# Patient Record
Sex: FEMALE | Race: BLACK OR AFRICAN AMERICAN | Employment: FULL TIME | URBAN - METROPOLITAN AREA
[De-identification: names, ages, dates, MRNs, and addresses within clinical notes are randomized per-mention and may not be internally consistent; named-entity substitution may affect disease eponyms.]

---

## 2017-03-27 ENCOUNTER — OFFICE VISIT (OUTPATIENT)
Dept: SURGERY | Age: 52
End: 2017-03-27

## 2017-03-27 VITALS
WEIGHT: 207 LBS | SYSTOLIC BLOOD PRESSURE: 120 MMHG | HEART RATE: 64 BPM | DIASTOLIC BLOOD PRESSURE: 70 MMHG | BODY MASS INDEX: 36.68 KG/M2 | RESPIRATION RATE: 20 BRPM | TEMPERATURE: 98.6 F | HEIGHT: 63 IN | OXYGEN SATURATION: 98 %

## 2017-03-27 DIAGNOSIS — K91.2 POSTOPERATIVE INTESTINAL MALABSORPTION: Primary | ICD-10-CM

## 2017-03-27 DIAGNOSIS — E55.9 VITAMIN D DEFICIENCY: ICD-10-CM

## 2017-03-27 DIAGNOSIS — E66.01 SEVERE OBESITY (BMI 35.0-39.9): ICD-10-CM

## 2017-03-27 NOTE — MR AVS SNAPSHOT
Visit Information Date & Time Provider Department Dept. Phone Encounter #  
 3/27/2017  8:00 AM Chantel Biswas NP [facility] GENERAL SURGERY [address] [phone] [id number] Upcoming Health Maintenance Date Due DTaP/Tdap/Td series (1 - Tdap) 9/3/1984 INFLUENZA AGE 9 TO ADULT 8/1/2016 PAP AKA CERVICAL CYTOLOGY 5/11/2018 BREAST CANCER SCRN MAMMOGRAM 8/13/2018 COLONOSCOPY 7/14/2021 Allergies as of 3/27/2017  Review Complete On: 3/27/2017 By: Chantel Biswas NP Severity Noted Reaction Type Reactions Bacitracin High 07/14/2011   Systemic Anaphylaxis Throat swelling, hives, abdominal pain Neosporin [Neomycin-bacitracin-polymyxin] Medium 07/14/2011   Systemic Swelling Eye drops caused eyes to swell Adhesive  09/10/2016   Topical Rash, Itching Surgical Glue (Derma Yates) Pineapple  09/27/2016    Hives [place]  09/27/2016    Hives Current Immunizations  Reviewed on 9/12/2016 Name Date Influenza Vaccine PF 11/7/2014 Not reviewed this visit You Were Diagnosed With   
  
 Codes Comments Postoperative intestinal malabsorption    -  Primary ICD-10-CM: K91.2 ICD-9-CM: 579.3 Severe obesity (BMI 35.0-39.9) (HCC)     ICD-10-CM: E66.01 
ICD-9-CM: 278.01 Vitamin D deficiency     ICD-10-CM: E55.9 ICD-9-CM: 268.9 Vitals BP Pulse Temp Resp Height(growth percentile) Weight(growth percentile) 120/70 (BP 1 Location: Left arm, BP Patient Position: Sitting) 64 98.6 °F (37 °C) 20 5' 3\" (1.6 m) 207 lb (93.9 kg) LMP SpO2 BMI OB Status Smoking Status 08/19/2014 98% 36.67 kg/m2 Postmenopausal Former Smoker Vitals History BMI and BSA Data Body Mass Index Body Surface Area  
 36.67 kg/m 2 2.04 m 2 Preferred Pharmacy Pharmacy Name Phone [id number] Dorene Justice [address] [place] [address] [phone] Your Updated Medication List  
  
   
 This list is accurate as of: 3/27/17  8:38 AM.  Always use your most recent med list.  
  
  
  
  
 CALCIUM CITRATE + D PO Take  by mouth. CLARITIN 10 mg tablet Generic drug:  loratadine Take 10 mg by mouth.  
  
 ergocalciferol 50,000 unit capsule Commonly known as:  ERGOCALCIFEROL  
TAKE ONE CAPSULE BY MOUTH EVERY 7 DAYS  
  
 famotidine 40 mg tablet Commonly known as:  PEPCID  
TAKE 1 TABLET BY MOUTH DAILY AFTER SURGERY  
  
 folic acid 1 mg tablet Commonly known as:  Google Take  by mouth daily. glucose blood VI test strips strip Commonly known as:  ACCU-CHEK SHAKIR Check BS BID  
  
 lisinopril 10 mg tablet Commonly known as:  Merilynn Morgan Take 1 Tab by mouth daily. methotrexate 2.5 mg tablet Commonly known as:  Rayfield Sides Take 10 mg by mouth Every Friday. Takes 20 mg weekly. pediatric multivitamins chewable tablet Take 1 Tab by mouth two (2) times a day. senna leaf Tea Take  by mouth. VITAMIN B-12 500 mcg tablet Generic drug:  cyanocobalamin Take 500 mcg by mouth daily. We Performed the Following VITAMIN D, 25 HYDROXY [id number] CPT(R)] Patient Instructions Ok to take the Famotidine (Pepcid) as needed Stay on your vitamins Add a scoop of protein powder to your \"green\" smoothie and pay attention to your facial rash (if gets worse with protein powder, switch to a vegan powder or shake) Schedule an appointment with the dietician, please call or e-mail Sammye Bence, RD at: 
 
[phone] [email or web address] Introducing [facility]! Dear [facility]. [facility]: Thank you for requesting a Vendor[employer] account. Our records indicate that you already have an active VendorShop account. You can access your account anytime at [email or web address] Did you know that you can access your hospital and ER discharge instructions at any time in Vendor[employer]?   You can also review all of your test results from your hospital stay or ER visit. Additional Information If you have questions, please visit the Frequently Asked Questions section of the [employer] website at [email or web address] Remember, [employer] is NOT to be used for urgent needs. For medical emergencies, dial 911. Now available from your iPhone and Android! Please provide this summary of care documentation to your next provider. Your primary care clinician is listed as Elmwood Barrier. If you have any questions after today's visit, please call [phone].

## 2017-03-27 NOTE — PROGRESS NOTES
Chief Complaint   Patient presents with    Surgical Follow-up     7 month s/p lap gastric band to lap gastric bypass down 45 pounds, lost 6.5 pounds     Patient is 7 months  status post revision adjustable gastric band to Malabsorptive gastric bypass for treatment of morbid obesity. Patient has lost 45 lbs. Since surgery. She is a little frustrated by her slow weight loss, but has lost inches. She is \"ok\" with it, but wants additional weight loss. She has gone from a size 18 to 14 and her energy levels are good. She is currently fasting for Laverne Stanislav and doing \"green\" smoothies for breakfast and lunch. She has not been adding protein powder. She is eating a solid food meal at dinner. She admits \"I know I am not getting enough protein\". Most foods are well tolerated. Patient is walking daily for activity. Her goal is 15 miles per week and she has been training for the Vendscreen. Bowels moving daily. Patient has had rare episodes of dysphagia due to poor chewing, eating too quickly and/or other behavioral factors. She is seeing a dermatologist for a facial rash. She has been having hot flashes. A1C 5.1%    Co-Morbid(s)     Resolved      Was anti coagulation initiated for presumed / confirmed DVT/PE? NO    Was an incisional hernia noted on exam?       NO      COMORBIDITY     SLEEP APNEA                 NO        GERD  (req.meds)           NO  HYPERLIPIDEMIA           NO  HYPERTENSION             YES       IF YES, # OF HTN MEDICATIONS 1  DIABETES                        NO      IF YES, 0 NON-INSULIN   0 INSULIN     Visit Vitals    /70 (BP 1 Location: Left arm, BP Patient Position: Sitting)    Pulse 64    Temp 98.6 °F (37 °C)    Resp 20    Ht 5' 3\" (1.6 m)    Wt 207 lb (93.9 kg)    LMP 08/19/2014    SpO2 98%    BMI 36.67 kg/m2     A + O x 3  Chest  CTA  COR  RRR  ABD Soft, NT/ND, +BS, no masses or hernias. EXT No edema; ambulating independently      ICD-10-CM ICD-9-CM    1.  Postoperative intestinal malabsorption K91.2 579.3 VITAMIN D, 25 HYDROXY   2. Severe obesity (BMI 35.0-39.9) ([facility]) E66.01 278.01    3. Vitamin D deficiency E55.9 268.9 VITAMIN D, 25 HYDROXY     7 months s/p revision adjustable gastric band to Malabsorptive gastric bypass for treatment of morbid obesity   Doing well   Referred to RD for support   Add protein powder to smoothies during \"fast\"   Try vegan protein powder and see if rash improves (may be related to whey powder)  Continue vitamins   Daily exercise   Check D level   Continue vitamins   Dyana Coe verbalized understanding and questions were answered to the best of my knowledge and ability. Diet and activity  educational materials were provided. 17 minutes spent in face to face with Dyana Coe > 50% counseling.

## 2017-03-27 NOTE — PROGRESS NOTES
1. Have you been to the ER, urgent care clinic since your last visit? Hospitalized since your last visit? No    2. Have you seen or consulted any other health care providers outside of the [address] [place] [address] since your last visit? Include any pap smears or colon screening.  No

## 2017-03-27 NOTE — PATIENT INSTRUCTIONS
Ok to take the Famotidine (Pepcid) as needed     Stay on your vitamins     Add a scoop of protein powder to your \"green\" smoothie and pay attention to your facial rash (if gets worse with protein powder, switch to a vegan powder or shake)     Schedule an appointment with the dietician, please call or e-mail   Andreia Echavarria RD at:    [phone]  [email or web address]

## 2017-04-07 LAB — 25(OH)D3+25(OH)D2 SERPL-MCNC: 54.4 NG/ML (ref 30–100)

## 2017-07-11 ENCOUNTER — TELEPHONE (OUTPATIENT)
Dept: SURGERY | Age: 52
End: 2017-07-11

## 2018-02-10 ENCOUNTER — HOSPITAL ENCOUNTER (OUTPATIENT)
Dept: MAMMOGRAPHY | Age: 53
Discharge: HOME | End: 2018-02-10
Attending: FAMILY MEDICINE
Payer: COMMERCIAL

## 2018-02-10 DIAGNOSIS — Z12.31 VISIT FOR SCREENING MAMMOGRAM: ICD-10-CM

## 2018-02-10 PROCEDURE — 77067 SCR MAMMO BI INCL CAD: CPT

## 2018-07-09 ENCOUNTER — TELEPHONE (OUTPATIENT)
Dept: SURGERY | Age: 53
End: 2018-07-09

## 2018-07-27 ENCOUNTER — OFFICE VISIT (OUTPATIENT)
Dept: URGENT CARE | Age: 53
End: 2018-07-27

## 2018-07-27 VITALS
RESPIRATION RATE: 16 BRPM | DIASTOLIC BLOOD PRESSURE: 76 MMHG | TEMPERATURE: 97.7 F | HEIGHT: 63 IN | WEIGHT: 202.4 LBS | HEART RATE: 66 BPM | BODY MASS INDEX: 35.86 KG/M2 | SYSTOLIC BLOOD PRESSURE: 112 MMHG | OXYGEN SATURATION: 99 %
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
## 2018-07-27 DIAGNOSIS — S16.1XXA STRAIN OF NECK MUSCLE, INITIAL ENCOUNTER: Primary | ICD-10-CM

## 2018-07-27 DIAGNOSIS — S39.012A STRAIN OF LUMBAR REGION, INITIAL ENCOUNTER: ICD-10-CM

## 2018-07-27 RX ORDER — METHOCARBAMOL 500 MG/1
500 TABLET, FILM COATED ORAL
Qty: 20 TAB | Refills: 0 | Status: SHIPPED | OUTPATIENT
Start: 2018-07-27 | End: 2019-10-25
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
## 2018-07-27 NOTE — PATIENT INSTRUCTIONS
Back Strain: Care Instructions  Your Care Instructions    Back strain happens when you overstretch, or pull, a muscle in your back. You may hurt your back in an accident or when you exercise or lift something. Most back pain will get better with rest and time. You can take care of yourself at home to help your back heal.  Follow-up care is a key part of your treatment and safety. Be sure to make and go to all appointments, and call your doctor if you are having problems. It's also a good idea to know your test results and keep a list of the medicines you take. How can you care for yourself at home? · Try to stay as active as you can, but stop or reduce any activity that causes pain. · Put ice or a cold pack on the sore muscle for 10 to 20 minutes at a time to stop swelling. Try this every 1 to 2 hours for 3 days (when you are awake) or until the swelling goes down. Put a thin cloth between the ice pack and your skin. · After 2 or 3 days, apply a heating pad on low or a warm cloth to your back. Some doctors suggest that you go back and forth between hot and cold treatments. · Take pain medicines exactly as directed. ¨ If the doctor gave you a prescription medicine for pain, take it as prescribed. ¨ If you are not taking a prescription pain medicine, ask your doctor if you can take an over-the-counter medicine. · Try sleeping on your side with a pillow between your legs. Or put a pillow under your knees when you lie on your back. These measures can ease pain in your lower back. · Return to your usual level of activity slowly. When should you call for help? Call 911 anytime you think you may need emergency care. For example, call if:    · You are unable to move a leg at all.   [facility] your doctor now or seek immediate medical care if:    · You have new or worse symptoms in your legs, belly, or buttocks. Symptoms may include:  ¨ Numbness or tingling. ¨ Weakness.   ¨ Pain.     · You lose bladder or bowel control.    Watch closely for changes in your health, and be sure to contact your doctor if:    · You have a fever, lose weight, or don't feel well.     · You are not getting better as expected. Where can you learn more? Go to http://gisell-marin.info/. Enter V383 in the search box to learn more about \"Back Strain: Care Instructions. \"  Current as of: November 29, 2017  Content Version: 11.7  © 2967-1596 [employer]. Care instructions adapted under license by [employer] (which disclaims liability or warranty for this information). If you have questions about a medical condition or this instruction, always ask your healthcare professional. [address] any warranty or liability for your use of this information. Neck Strain: Care Instructions  Your Care Instructions    You have strained the muscles and ligaments in your neck. A sudden, awkward movement can strain the neck. This often occurs with falls or car accidents or during certain sports. Everyday activities like working on a computer or sleeping can also cause neck strain if they force you to hold your neck in an awkward position for a long time. It is common for neck pain to get worse for a day or two after an injury, but it should start to feel better after that. You may have more pain and stiffness for several days before it gets better. This is expected. It may take a few weeks or longer for it to heal completely. Good home treatment can help you get better faster and avoid future neck problems. Follow-up care is a key part of your treatment and safety. Be sure to make and go to all appointments, and call your doctor if you are having problems. It's also a good idea to know your test results and keep a list of the medicines you take. How can you care for yourself at home?   · If you were given a neck brace (cervical collar) to limit neck motion, wear it as instructed for as many days as your doctor tells you to. Do not wear it longer than you were told to. Wearing a brace for too long can make neck stiffness worse and weaken the neck muscles. · You can try using heat or ice to see if it helps. ¨ Try using a heating pad on a low or medium setting for 15 to 20 minutes every 2 to 3 hours. Try a warm shower in place of one session with the heating pad. You can also buy single-use heat wraps that last up to 8 hours. ¨ You can also try an ice pack for 10 to 15 minutes every 2 to 3 hours. · Take pain medicines exactly as directed. ¨ If the doctor gave you a prescription medicine for pain, take it as prescribed. ¨ If you are not taking a prescription pain medicine, ask your doctor if you can take an over-the-counter medicine. · Gently rub the area to relieve pain and help with blood flow. Do not massage the area if it hurts to do so. · Do not do anything that makes the pain worse. Take it easy for a couple of days. You can do your usual activities if they do not hurt your neck or put it at risk for more stress or injury. · Try sleeping on a special neck pillow. Place it under your neck, not under your head. Placing a tightly rolled-up towel under your neck while you sleep will also work. If you use a neck pillow or rolled towel, do not use your regular pillow at the same time. · To prevent future neck pain, do exercises to stretch and strengthen your neck and back. Learn how to use good posture, safe lifting techniques, and proper body mechanics. When should you call for help? Call 911 anytime you think you may need emergency care. For example, call if:    · You are unable to move an arm or a leg at all.   [facility] your doctor now or seek immediate medical care if:    · You have new or worse symptoms in your arms, legs, chest, belly, or buttocks. Symptoms may include:  ¨ Numbness or tingling. ¨ Weakness.   ¨ Pain.     · You lose bladder or bowel control.    Watch closely for changes in your health, and be sure to contact your doctor if:    · You are not getting better as expected. Where can you learn more? Go to http://gisell-marin.info/. Enter M253 in the search box to learn more about \"Neck Strain: Care Instructions. \"  Current as of: November 29, 2017  Content Version: 11.7  © 5958-7972 [employer]. Care instructions adapted under license by [employer] (which disclaims liability or warranty for this information). If you have questions about a medical condition or this instruction, always ask your healthcare professional. [place] [address] any warranty or liability for your use of this information. Neck Strain or Sprain: Rehab Exercises  Your Care Instructions  Here are some examples of typical rehabilitation exercises for your condition. Start each exercise slowly. Ease off the exercise if you start to have pain. Your doctor or physical therapist will tell you when you can start these exercises and which ones will work best for you. How to do the exercises  Neck rotation    1. Sit in a firm chair, or stand up straight. 2. Keeping your chin level, turn your head to the right, and hold for 15 to 30 seconds. 3. Turn your head to the left and hold for 15 to 30 seconds. 4. Repeat 2 to 4 times to each side. Neck stretches    1. Look straight ahead, and tip your right ear to your right shoulder. Do not let your left shoulder rise up as you tip your head to the right. 2. Hold for 15 to 30 seconds. 3. Tilt your head to the left. Do not let your right shoulder rise up as you tip your head to the left. 4. Hold for 15 to 30 seconds. 5. Repeat 2 to 4 times to each side. Forward neck flexion    1. Sit in a firm chair, or stand up straight. 2. Bend your head forward. 3. Hold for 15 to 30 seconds. 4. Repeat 2 to 4 times. Lateral (side) bend strengthening    1.  With your right hand, place your first two fingers on your right temple. 2. Start to bend your head to the side while using gentle pressure from your fingers to keep your head from bending. 3. Hold for about 6 seconds. 4. Repeat 8 to 12 times. 5. Switch hands and repeat the same exercise on your left side. Forward bend strengthening    1. Place your first two fingers of either hand on your forehead. 2. Start to bend your head forward while using gentle pressure from your fingers to keep your head from bending. 3. Hold for about 6 seconds. 4. Repeat 8 to 12 times. Neutral position strengthening    1. Using one hand, place your fingertips on the back of your head at the top of your neck. 2. Start to bend your head backward while using gentle pressure from your fingers to keep your head from bending. 3. Hold for about 6 seconds. 4. Repeat 8 to 12 times. Chin tuck    1. Lie on the floor with a rolled-up towel under your neck. Your head should be touching the floor. 2. Slowly bring your chin toward your chest.  3. Hold for a count of 6, and then relax for up to 10 seconds. 4. Repeat 8 to 12 times. Follow-up care is a key part of your treatment and safety. Be sure to make and go to all appointments, and call your doctor if you are having problems. It's also a good idea to know your test results and keep a list of the medicines you take. Where can you learn more? Go to http://gisell-marin.info/. Enter M679 in the search box to learn more about \"Neck Strain or Sprain: Rehab Exercises. \"  Current as of: November 29, 2017  Content Version: 11.7  © 6652-6559 [employer]. Care instructions adapted under license by [employer] (which disclaims liability or warranty for this information). If you have questions about a medical condition or this instruction, always ask your healthcare professional. [address] any warranty or liability for your use of this information.
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
## 2019-07-05 ENCOUNTER — TELEPHONE (OUTPATIENT)
Dept: SURGERY | Age: 54
End: 2019-07-05

## 2019-10-25 RX ORDER — OMEPRAZOLE 40 MG/1
40 CAPSULE, DELAYED RELEASE ORAL DAILY
COMMUNITY

## 2019-10-25 RX ORDER — HYDROCHLOROTHIAZIDE 25 MG/1
25 TABLET ORAL DAILY
COMMUNITY

## 2019-10-28 ENCOUNTER — ANESTHESIA (OUTPATIENT)
Dept: ENDOSCOPY | Age: 54
End: 2019-10-28
Payer: COMMERCIAL

## 2019-10-28 ENCOUNTER — ANESTHESIA EVENT (OUTPATIENT)
Dept: ENDOSCOPY | Age: 54
End: 2019-10-28
Payer: COMMERCIAL

## 2019-10-28 ENCOUNTER — HOSPITAL ENCOUNTER (OUTPATIENT)
Age: 54
Setting detail: OUTPATIENT SURGERY
Discharge: HOME | End: 2019-10-28
Attending: INTERNAL MEDICINE | Admitting: INTERNAL MEDICINE
Payer: COMMERCIAL

## 2019-10-28 VITALS
HEIGHT: 63 IN | BODY MASS INDEX: 38.64 KG/M2 | SYSTOLIC BLOOD PRESSURE: 109 MMHG | TEMPERATURE: 97.6 F | RESPIRATION RATE: 11 BRPM | OXYGEN SATURATION: 90 % | DIASTOLIC BLOOD PRESSURE: 61 MMHG | HEART RATE: 60 BPM | WEIGHT: 218.06 LBS

## 2019-10-28 PROCEDURE — 74011250636 HC RX REV CODE- 250/636: Performed by: ANESTHESIOLOGY

## 2019-10-28 PROCEDURE — 74011000250 HC RX REV CODE- 250: Performed by: ANESTHESIOLOGY

## 2019-10-28 PROCEDURE — 76060000031 HC ANESTHESIA FIRST 0.5 HR: Performed by: INTERNAL MEDICINE

## 2019-10-28 PROCEDURE — 76040000019: Performed by: INTERNAL MEDICINE

## 2019-10-28 PROCEDURE — 74011250636 HC RX REV CODE- 250/636: Performed by: INTERNAL MEDICINE

## 2019-10-28 RX ORDER — EPINEPHRINE 0.1 MG/ML
1 INJECTION INTRACARDIAC; INTRAVENOUS
Status: DISCONTINUED | OUTPATIENT
Start: 2019-10-28 | End: 2019-10-28 | Stop reason: HOSPADM

## 2019-10-28 RX ORDER — ATROPINE SULFATE 0.1 MG/ML
0.5 INJECTION INTRAVENOUS
Status: DISCONTINUED | OUTPATIENT
Start: 2019-10-28 | End: 2019-10-28 | Stop reason: HOSPADM

## 2019-10-28 RX ORDER — SODIUM CHLORIDE 9 MG/ML
75 INJECTION, SOLUTION INTRAVENOUS CONTINUOUS
Status: DISCONTINUED | OUTPATIENT
Start: 2019-10-28 | End: 2019-10-28 | Stop reason: HOSPADM

## 2019-10-28 RX ORDER — SODIUM CHLORIDE 0.9 % (FLUSH) 0.9 %
5-40 SYRINGE (ML) INJECTION EVERY 8 HOURS
Status: DISCONTINUED | OUTPATIENT
Start: 2019-10-28 | End: 2019-10-28 | Stop reason: HOSPADM

## 2019-10-28 RX ORDER — DEXTROMETHORPHAN/PSEUDOEPHED 2.5-7.5/.8
1.2 DROPS ORAL
Status: DISCONTINUED | OUTPATIENT
Start: 2019-10-28 | End: 2019-10-28 | Stop reason: HOSPADM

## 2019-10-28 RX ORDER — PROPOFOL 10 MG/ML
INJECTION, EMULSION INTRAVENOUS AS NEEDED
Status: DISCONTINUED | OUTPATIENT
Start: 2019-10-28 | End: 2019-10-28 | Stop reason: HOSPADM

## 2019-10-28 RX ORDER — FENTANYL CITRATE 50 UG/ML
25 INJECTION, SOLUTION INTRAMUSCULAR; INTRAVENOUS
Status: DISCONTINUED | OUTPATIENT
Start: 2019-10-28 | End: 2019-10-28 | Stop reason: HOSPADM

## 2019-10-28 RX ORDER — LIDOCAINE HYDROCHLORIDE 20 MG/ML
INJECTION, SOLUTION EPIDURAL; INFILTRATION; INTRACAUDAL; PERINEURAL AS NEEDED
Status: DISCONTINUED | OUTPATIENT
Start: 2019-10-28 | End: 2019-10-28 | Stop reason: HOSPADM

## 2019-10-28 RX ORDER — FLUMAZENIL 0.1 MG/ML
0.2 INJECTION INTRAVENOUS
Status: DISCONTINUED | OUTPATIENT
Start: 2019-10-28 | End: 2019-10-28 | Stop reason: HOSPADM

## 2019-10-28 RX ORDER — MIDAZOLAM HYDROCHLORIDE 1 MG/ML
.25-5 INJECTION, SOLUTION INTRAMUSCULAR; INTRAVENOUS
Status: DISCONTINUED | OUTPATIENT
Start: 2019-10-28 | End: 2019-10-28 | Stop reason: HOSPADM

## 2019-10-28 RX ORDER — SODIUM CHLORIDE 0.9 % (FLUSH) 0.9 %
5-40 SYRINGE (ML) INJECTION AS NEEDED
Status: DISCONTINUED | OUTPATIENT
Start: 2019-10-28 | End: 2019-10-28 | Stop reason: HOSPADM

## 2019-10-28 RX ORDER — NALOXONE HYDROCHLORIDE 0.4 MG/ML
0.4 INJECTION, SOLUTION INTRAMUSCULAR; INTRAVENOUS; SUBCUTANEOUS
Status: DISCONTINUED | OUTPATIENT
Start: 2019-10-28 | End: 2019-10-28 | Stop reason: HOSPADM

## 2019-10-28 RX ADMIN — PROPOFOL 150 MG: 10 INJECTION, EMULSION INTRAVENOUS at 09:12

## 2019-10-28 RX ADMIN — LIDOCAINE HYDROCHLORIDE 40 MG: 20 INJECTION, SOLUTION EPIDURAL; INFILTRATION; INTRACAUDAL; PERINEURAL at 09:00

## 2019-10-28 RX ADMIN — SODIUM CHLORIDE 75 ML/HR: 900 INJECTION, SOLUTION INTRAVENOUS at 08:04

## 2019-10-28 NOTE — DISCHARGE INSTRUCTIONS
Jeremiah Main  [id number]  1965     COLON DISCHARGE INSTRUCTIONS  Discomfort:  Redness at IV site- apply warm compress to area; if redness or soreness persist- contact your physician  There may be a slight amount of blood passed from the rectum  Gaseous discomfort- walking, belching will help relieve any discomfort  You may not operate a vehicle for 12 hours  You may not engage in an occupation involving machinery or appliances for rest of today  You may not drink alcoholic beverages for at least 12 hours  Avoid making any critical decisions for at least 24 hour  DIET:   High fiber diet. - however -  remember your colon is empty and a heavy meal will produce gas. Avoid these foods:  vegetables, fried / greasy foods, carbonated drinks for today  MEDICATION:         ACTIVITY:  You may not resume your normal daily activities until tomorrow AM; it is recommended that you spend the remainder of the day resting -  avoid any strenuous activity. CALL M.D.   ANY SIGN OF:   Increasing pain, nausea, vomiting  Abdominal distension (swelling)  New increased bleeding (oral or rectal)  Fever (chills)  IMPRESSION:  -Internal hemorrhoids  -No masses or polyps are noted    Follow-up Instructions:   Call Dr. Maggi Bunch if questions arise regarding your procedure  Telephone # [phone]  Repeat colonoscopy in 5 years    Chandra Connell MD

## 2019-10-28 NOTE — PROCEDURES
NAME:  Savana Garcia   :   1965   MRN:   [id number]     Date/Time:  10/28/2019 9:17 AM    Colonoscopy Operative Report    Procedure Type:   Colonoscopy --screening     Indications:     Family history of coloretal adenoma  (screening only)  Pre-operative Diagnosis: see indication above  Post-operative Diagnosis:  See findings below  :  Alicia Burt MD  Referring Provider: Rafy Wu MD    Exam:  Airway: clear, no airway problems anticipated  Heart: RRR, without gallops or rubs  Lungs: clear bilaterally without wheezes, crackles, or rhonchi  Abdomen: soft, nontender, nondistended, bowel sounds present  Mental Status: awake, alert and oriented to person, place and time    Sedation:  MAC anesthesia Propofol 150mg IV  Procedure Details:  After informed consent was obtained with all risks and benefits of procedure explained and preoperative exam completed, the patient was taken to the endoscopy suite and placed in the left lateral decubitus position. Upon sequential sedation as per above, a digital rectal exam was performed demonstrating internal hemorrhoids. The Olympus videocolonoscope  was inserted in the rectum and carefully advanced to the cecum, which was identified by the ileocecal valve and appendiceal orifice. The quality of preparation was excellent. The colonoscope was slowly withdrawn with careful evaluation between folds. Retroflexion in the rectum was completed demonstrating internal hemorrhoids.      Findings:    -Internal hemorrhoids  -No masses or polyps are noted    Specimen Removed:  none  Complications: None. EBL:  None.     Impression:    -Internal hemorrhoids  -No masses or polyps are noted    Recommendations: --Repeat colonoscopy in 5 years given brother's history of polyps. , -Follow up with primary care physician. High fiber diet. Resume normal medication(s).          Discharge Disposition:  Home in the company of a  when able to ambulate.   Evan Katarzyna Linder MD

## 2019-10-28 NOTE — PERIOP NOTES
Jeremiah Main  1965  [id number]    Situation:  Verbal report received from: Tila Brooke RN  Procedure: Procedure(s):  COLONOSCOPY    Background:    Preoperative diagnosis: FAMILY HISTORY OF COLON POLYPS  Postoperative diagnosis: hemorrhoids    :  Dr. Maggi Bunch  Assistant(s): Endoscopy Technician-1: Ksenia Rodriguez  Endoscopy RN-1: Nusrat Lewis RN    Specimens: * No specimens in log *  H. Pylori  no    Assessment:  Intra-procedure medications   Anesthesia gave intra-procedure sedation and medications, see anesthesia flow sheet yes    Intravenous fluids: NS@ KVO     Vital signs stable     Abdominal assessment: round and soft     Recommendation:  Discharge patient per MD order.   Family or Friend   Permission to share finding with family or friend no

## 2019-10-28 NOTE — H&P
Gastroenterology Outpatient History and Physical    Patient: Sb Hoskins    Physician: Laila Delarosa MD    Chief Complaint:  fam hx colon polyps  History of Present Illness: 62yo F  fam hx colon polyps. LAst colonoscopy 7/2011    History:  Past Medical History:   Diagnosis Date    Anemia     Diabetes ([address]     GERD (gastroesophageal reflux disease)     Hypertension     IGT (impaired glucose tolerance)     Ill-defined condition     LOGAN uses CPAP l0    Menopause     Morbid obesity (HCC)     LOGAN (obstructive sleep apnea)     + CPAP    Reflux     Vitamin D deficiency       Past Surgical History:   Procedure Laterality Date    HX GASTRIC BYPASS  08/31/2016    removal of gastric banding and conversion to gastric bypass    HX GI      anal sphincterotomy    HX HEART CATHETERIZATION      \"years ago\" FOR PALPITATIONS    HX OTHER SURGICAL  5/06    lap-band    HI COLONOSCOPY FLX DX W/COLLJ SPEC WHEN PFRMD  7/14/2011           Social History     Socioeconomic History    Marital status: SINGLE     Spouse name: Not on file    Number of children: 0    Years of education: Not on file    Highest education level: Not on file   Occupational History    Occupation: RN case mgr     Comment: desk work   Tobacco Use    Smoking status: Former Smoker     Years: 1.00     Types: Cigarettes    Smokeless tobacco: Former User     Quit date: 8/15/1984   Substance and Sexual Activity    Alcohol use: Not Currently     Alcohol/week: 0.0 standard drinks    Drug use: No    Sexual activity: Never   Social History Narrative    In the home with dog. Sister coming to help after surgery.       Family History   Problem Relation Age of Onset    Diabetes Mother     Kidney Disease Mother     Cancer Father         esophageal      Patient Active Problem List   Diagnosis Code    Vitamin D deficiency E55.9    Anemia D64.9    IGT (impaired glucose tolerance) R73.02    Hypertension, uncontrolled I10    LOGAN (obstructive sleep apnea) G47.33    Status following surgery for weight loss Z98.84    Morbid obesity (HCC) E66.01    Rash R21       Allergies: Allergies   Allergen Reactions    Bacitracin Anaphylaxis     Throat swelling, hives, abdominal pain    Neosporin [Neomycin-Bacitracin-Polymyxin] Swelling     Eye drops caused eyes to swell    Adhesive Rash and Itching     Surgical Glue (Derma Yates)    Pineapple Hives    Strawberry Hives     Medications:   Prior to Admission medications    Medication Sig Start Date End Date Taking? Authorizing Provider   hydroCHLOROthiazide (HYDRODIURIL) 25 mg tablet Take 25 mg by mouth daily. Yes Provider, Historical   omeprazole (PRILOSEC) 40 mg capsule Take 40 mg by mouth daily. Yes Provider, Historical   pediatric multivitamins chewable tablet Take 1 Tab by mouth two (2) times a day. Yes Provider, Historical   lisinopril (PRINIVIL, ZESTRIL) 10 mg tablet Take 1 Tab by mouth daily. 7/8/16  Yes Shawna Pierre MD   ergocalciferol (ERGOCALCIFEROL) 50,000 unit capsule TAKE ONE CAPSULE BY MOUTH EVERY 7 DAYS  Patient taking differently: Fridays 5/9/15  Yes Shawna Pierre MD   loratadine (CLARITIN) 10 mg tablet Take 10 mg by mouth daily as needed. Provider, Historical     Physical Exam:   Vital Signs: Blood pressure 123/73, pulse 70, temperature 98 °F (36.7 °C), resp. rate 16, height 5' 3\" (1.6 m), weight 98.9 kg (218 lb 1 oz), last menstrual period 08/19/2014, SpO2 99 %, not currently breastfeeding.   General: well developed, well nourished   HEENT: unremarkable   Heart: regular rhythm no mumur    Lungs: clear   Abdominal:  benign   Neurological: unremarkable   Extremities: no edema     Findings/Diagnosis: fam hx colon polyps  Plan of Care/Planned Procedure: colonosocpy with conscious/deep sedation    Signed:  Hao Coleman MD 10/28/2019

## 2019-10-28 NOTE — ANESTHESIA PREPROCEDURE EVALUATION
Anesthetic History   No history of anesthetic complications            Review of Systems / Medical History  Patient summary reviewed, nursing notes reviewed and pertinent labs reviewed    Pulmonary        Sleep apnea: CPAP           Neuro/Psych   Within defined limits           Cardiovascular    Hypertension: well controlled                   GI/Hepatic/Renal     GERD: well controlled           Endo/Other    Diabetes: well controlled, type 2    Morbid obesity     Other Findings              Physical Exam    Airway  Mallampati: II  TM Distance: > 6 cm  Neck ROM: normal range of motion   Mouth opening: Normal     Cardiovascular  Regular rate and rhythm,  S1 and S2 normal,  no murmur, click, rub, or gallop             Dental    Dentition: Implants     Pulmonary  Breath sounds clear to auscultation               Abdominal  GI exam deferred       Other Findings            Anesthetic Plan    ASA: 3  Anesthesia type: general and total IV anesthesia          Induction: Intravenous  Anesthetic plan and risks discussed with: Patient      Propofol MAC

## 2020-06-03 ENCOUNTER — HOSPITAL ENCOUNTER (OUTPATIENT)
Dept: MAMMOGRAPHY | Age: 55
Discharge: HOME | End: 2020-06-03
Attending: FAMILY MEDICINE
Payer: COMMERCIAL

## 2020-06-03 DIAGNOSIS — Z12.31 VISIT FOR SCREENING MAMMOGRAM: ICD-10-CM

## 2020-06-03 PROCEDURE — 77067 SCR MAMMO BI INCL CAD: CPT

## 2020-06-25 ENCOUNTER — DOCUMENTATION ONLY (OUTPATIENT)
Dept: SLEEP MEDICINE | Age: 55
End: 2020-06-25

## 2020-07-01 ENCOUNTER — DOCUMENTATION ONLY (OUTPATIENT)
Dept: SLEEP MEDICINE | Age: 55
End: 2020-07-01

## 2020-07-01 ENCOUNTER — VIRTUAL VISIT (OUTPATIENT)
Dept: SLEEP MEDICINE | Age: 55
End: 2020-07-01

## 2020-07-01 DIAGNOSIS — G47.33 OSA (OBSTRUCTIVE SLEEP APNEA): Primary | ICD-10-CM

## 2020-07-01 DIAGNOSIS — I10 ESSENTIAL HYPERTENSION: ICD-10-CM

## 2020-07-01 NOTE — PROGRESS NOTES
[address] [facility], [address]  Tel.  [phone]  Fax. [address] [facility] 60  [place], [address] [facility]  Tel.  [phone]  Fax. [phone] [address] [address] [address]   Tel.  [phone]  Fax. [phone]       S>    Maryam Farley is a 62 y.o. female who was seen by synchronous (real-time) audio-video technology on 7/1/2020. Consent:  She and/or her healthcare decision maker is aware that this patient-initiated Telehealth encounter is a billable service, with coverage as determined by her insurance carrier. She is aware that she may receive a bill and has provided verbal consent to proceed: Yes    I was at home while conducting this encounter. Patient verified with 's License. Part of the visit was done on the phone without visual due to technical difficulties. .    She reports no problems using the device. She is 83% compliant over the past 30 days. The following problems are identified:    Drowsiness no Problems exhaling no   Snoring no Forget to put on no   Mask Comfortable yes Can't fall asleep no   Dry Mouth no Mask falls off no   Air Leaking no Frequent awakenings no         She admits that her sleep has improved on PAP therapy using nasal mask and heated tubing. She reports of not feeling as rested as she used to when she first started using the PAP. Patient reports of developing a line on the bridge of her nose. Allergies   Allergen Reactions    Bacitracin Anaphylaxis     Throat swelling, hives, abdominal pain    Neosporin [Neomycin-Bacitracin-Polymyxin] Swelling     Eye drops caused eyes to swell    Adhesive Rash and Itching     Surgical Glue (Derma Yates)    Pineapple Hives    Strawberry Hives       She has a current medication list which includes the following prescription(s): hydrochlorothiazide, omeprazole, pediatric multivitamins, loratadine, lisinopril, and ergocalciferol. .      She  has a past medical history of Anemia, Diabetes ([address] GERD (gastroesophageal reflux disease), Hypertension, IGT (impaired glucose tolerance), Ill-defined condition, Menopause, Morbid obesity ([address] LOGAN (obstructive sleep apnea), Reflux, and Vitamin D deficiency. [place] Sleepiness Score: 3   and Modified F.O.S.Q. Score Total / 2: 17.5   which reflect improved sleep quality over therapy time. O>        Patient-Reported Vitals 7/1/2020   Patient-Reported Weight 225 lbs   Patient-Reported Height 5' 3\"       Physical Exam completed by visual and auditory observation of patient with verbal input from patient. General:   Alert, oriented, not in acute distress   Eyes:  Anicteric Sclerae; no obvious strabismus   Nose:  No obvious nasal septum deviation    Neck:   Midline trachea, no visible mass   Chest/Lungs:  Respiratory effort normal, no visualized signs of difficulty breathing or respiratory distress   CVS:  No JVD   Extremities:  No obvious rashes noted on face, neck, or hands   Neuro:  No facial asymmetry, no focal deficits; no obvious tremor    Psych:  Normal affect,  normal countenance         A>    ICD-10-CM ICD-9-CM    1. LOGAN (obstructive sleep apnea) G47.33 327.23 AMB SUPPLY ORDER      AMB SUPPLY ORDER   2. Essential hypertension I10 401.9    3. BMI 38.0-38.9,adult Z68.38 V85.38      AHI = 7 (2015). On Resmed :  APAP 5-9 cmH2O. Compliant:      yes    Therapeutic Response:  Positive    P>    * Patient is using her PAP device regularly and benefiting form therapy,  continued use of the device at 6-12 cmH2O is advised. Change made in AirView. Orders Placed This Encounter    AMB SUPPLY ORDER     Diagnosis: (G47.33) LOGAN (obstructive sleep apnea)  (primary encounter diagnosis)     Replacement Supplies for Positive Airway Pressure Therapy Device:   Duration of need: 99 months.  Nasal Cushion (Replace) 2 per month.  Nasal Interface Mask 1 every 3 months.  Headgear 1 every 6 months.      Tubing with heating element 1 every 3 months.  Filter(s) Disposable 2 per month.  Filter(s) Non-Disposable 1 every 6 months. .   [address] for Humidifier (Replace) 1 every 6 months. Vania Barr MD, FAA; NPI: [id number]    Electronically signed. Date:- 07/01/20    AMB SUPPLY ORDER     Diagnosis: Sleep Apnea ICD-10 Code (G47.30); ICD-9 Code (780.57). CPAP mask - Perform mask fit and provide patient with appropriate mask / headgear. Vania Barr MD, FAA; NPI: [id number]  Electronically signed. 07/01/20       * She is aware of the relationship between LOGAN and HTN which is stable. * We have recommended a dedicated weight loss through appropriate diet and an exercise regiment as significant weight reduction has been shown to reduce severity of obstructive sleep apnea. *   Follow-up and Dispositions    · Return in about 1 year (around 7/1/2021), or if symptoms worsen or fail to improve. * She was asked to contact our office for any problems regarding PAP therapy. * Counseling was provided regarding the importance of regular PAP use and on proper sleep hygiene and safe driving. * Re-enforced proper and regular cleaning for the device. Thank you for allowing us to participate in your patient's medical care. Office visit exceeded 30 minutes with counseling and direction of care taking up more than 50% of the allotted time. Pursuant to the emergency declaration under the [facility], [facility] waiver authority and the [employer] Act, this Virtual  Visit was conducted, with patient's consent, to reduce the patient's risk of exposure to COVID-19 and provide continuity of care for an established patient. Services were provided through a video synchronous discussion virtually to substitute for in-person clinic visit.       Romulo Ellis MD, Porfirio Umana  Electronically signed.  07/01/20

## 2020-07-01 NOTE — PATIENT INSTRUCTIONS
[address] [place]. [address], [address] Tel.  [phone] Fax. 100 [facility] 60 [place], [address] [facility] Tel.  [phone] Fax. [phone] [address] Tel.  [phone] Fax. [phone] Learning About CPAP for Sleep Apnea What is CPAP? CPAP is a small machine that you use at home every night while you sleep. It increases air pressure in your throat to keep your airway open. When you have sleep apnea, this can help you sleep better so you feel much better. CPAP stands for \"continuous positive airway pressure. \" The CPAP machine will have one of the following: · A mask that covers your nose and mouth · Prongs that fit into your nose · A mask that covers your nose only, the most common type. This type is called NCPAP. The N stands for \"nasal.\" Why is it done? CPAP is usually the best treatment for obstructive sleep apnea. It is the first treatment choice and the most widely used. Your doctor may suggest CPAP if you have: · Moderate to severe sleep apnea. · Sleep apnea and coronary artery disease (CAD) or heart failure. How does it help? · CPAP can help you have more normal sleep, so you feel less sleepy and more alert during the daytime. · CPAP may help keep heart failure or other heart problems from getting worse. · NCPAP may help lower your blood pressure. · If you use CPAP, your bed partner may also sleep better because you are not snoring or restless. What are the side effects? Some people who use CPAP have: · A dry or stuffy nose and a sore throat. · Irritated skin on the face. · Sore eyes. · Bloating. If you have any of these problems, work with your doctor to fix them. Here are some things you can try: · Be sure the mask or nasal prongs fit well. · See if your doctor can adjust the pressure of your CPAP. · If your nose is dry, try a humidifier. · If your nose is runny or stuffy, try decongestant medicine or a steroid nasal spray. If these things do not help, you might try a different type of machine. Some machines have air pressure that adjusts on its own. Others have air pressures that are different when you breathe in than when you breathe out. This may reduce discomfort caused by too much pressure in your nose. Where can you learn more? Go to Science.be Enter M909 in the search box to learn more about \"Learning About CPAP for Sleep Apnea. \"  
© 3613-3513 Healthwise, Incorporated. Care instructions adapted under license by [address] [place] [address] (which disclaims liability or warranty for this information). This care instruction is for use with your licensed healthcare professional. If you have questions about a medical condition or this instruction, always ask your healthcare professional. [address] any warranty or liability for your use of this information. Content Version: 0.8.57816; Last Revised: January 11, 2010 PROPER SLEEP HYGIENE What to avoid · Do not have drinks with caffeine, such as coffee or black tea, for 8 hours before bed. · Do not smoke or use other types of tobacco near bedtime. Nicotine is a stimulant and can keep you awake. · Avoid drinking alcohol late in the evening, because it can cause you to wake in the middle of the night. · Do not eat a big meal close to bedtime. If you are hungry, eat a light snack. · Do not drink a lot of water close to bedtime, because the need to urinate may wake you up during the night. · Do not read or watch TV in bed. Use the bed only for sleeping and sexual activity. What to try · Go to bed at the same time every night, and wake up at the same time every morning. Do not take naps during the day. · Keep your bedroom quiet, dark, and cool. · Get regular exercise, but not within 3 to 4 hours of your bedtime. Sandro Horner · Sleep on a comfortable pillow and mattress. · If watching the clock makes you anxious, turn it facing away from you so you cannot see the time. · If you worry when you lie down, start a worry book. Well before bedtime, write down your worries, and then set the book and your concerns aside. · Try meditation or other relaxation techniques before you go to bed. · If you cannot fall asleep, get up and go to another room until you feel sleepy. Do something relaxing. Repeat your bedtime routine before you go to bed again. · Make your house quiet and calm about an hour before bedtime. Turn down the lights, turn off the TV, log off the computer, and turn down the volume on music. This can help you relax after a busy day. Drowsy Driving: The [employer] cites drowsiness as a causing factor in more than 850,041 police reported crashes annually, resulting in 76,000 injuries and 1,500 deaths. Other surveys suggest 55% of people polled have driven while drowsy in the past year, 23% had fallen asleep but not crashed, 3% crashed, and 2% had and accident due to drowsy driving. Who is at risk? Young Drivers: One study of drowsy driving accidents states that 55% of the drivers were under 25 years. Of those, 75% were male. Shift Workers and Travelers: People who work overnight or travel across time zones frequently are at higher risk of experiencing Circadian Rhythm Disorders. They are trying to work and function when their body is programed to sleep. Sleep Deprived: Lack of sleep has a serious impact on your ability to pay attention or focus on a task. Consistently getting less than the average of 8 hours your body needs creates partial or cumulative sleep deprivation.   
Untreated Sleep Disorders: Sleep Apnea, Narcolepsy, R.L.S., and other sleep disorders (untreated) prevent a person from getting enough restful sleep. This leads to excessive daytime sleepiness and increases the risk for drowsy driving accidents by up to 7 times. Medications / Alcohol: Even over the counter medications can cause drowsiness. Medications that impair a drivers attention should have a warning label. Alcohol naturally makes you sleepy and on its own can cause accidents. Combined with excessive drowsiness its effects are amplified. Signs of Drowsy Driving: * You don't remember driving the last few miles * You may drift out of your daphne * You are unable to focus and your thoughts wander * You may yawn more often than normal 
 * You have difficulty keeping your eyes open / nodding off * Missing traffic signs, speeding, or tailgating Prevention-  
Good sleep hygiene, lifestyle and behavioral choices have the most impact on drowsy driving. There is no substitute for sleep and the average person requires 8 hours nightly. If you find yourself driving drowsy, stop and sleep. Consider the sleep hygiene tips provided during your visit as well. Medication Refill Policy: Refills for all medications require 1 week advance notice. Please have your pharmacy fax a refill request. We are unable to fax, or call in \"controled substance\" medications and you will need to pick these prescriptions up from our office. [employer] Activation Thank you for requesting access to [employer]. Please follow the instructions below to securely access and download your online medical record. [employer] allows you to send messages to your doctor, view your test results, renew your prescriptions, schedule appointments, and more. How Do I Sign Up? 1. In your internet browser, go to [email or web address]. 2. Click on the First Time User? Click Here link in the Sign In box. You will see the New Member Sign Up page. 3. Enter your [employer] Access Code exactly as it appears below.  You will not need to use this code after youve completed the sign-up process. If you do not sign up before the expiration date, you must request a new code. [employer] Access Code: Activation code not generated Current [employer] Status: Active (This is the date your [employer] access code will ) 4. Enter the last four digits of your Social Security Number (xxxx) and Date of Birth (mm/dd/yyyy) as indicated and click Submit. You will be taken to the next sign-up page. 5. Create a Lot18t ID. This will be your [employer] login ID and cannot be changed, so think of one that is secure and easy to remember. 6. Create a [employer] password. You can change your password at any time. 7. Enter your Password Reset Question and Answer. This can be used at a later time if you forget your password. 8. Enter your e-mail address. You will receive e-mail notification when new information is available in [address] 9. Click Sign Up. You can now view and download portions of your medical record. 10. Click the Download Summary menu link to download a portable copy of your medical information. Additional Information If you have questions, please call [phone]. Remember, [employer] is NOT to be used for urgent needs. For medical emergencies, dial 911.

## 2020-07-02 ENCOUNTER — TELEPHONE (OUTPATIENT)
Dept: SURGERY | Age: 55
End: 2020-07-02

## 2020-09-08 ENCOUNTER — APPOINTMENT (OUTPATIENT)
Dept: GENERAL RADIOLOGY | Age: 55
End: 2020-09-08
Attending: EMERGENCY MEDICINE
Payer: COMMERCIAL

## 2020-09-08 ENCOUNTER — HOSPITAL ENCOUNTER (EMERGENCY)
Age: 55
Discharge: HOME | End: 2020-09-08
Attending: EMERGENCY MEDICINE
Payer: COMMERCIAL

## 2020-09-08 VITALS
TEMPERATURE: 98.5 F | WEIGHT: 230 LBS | DIASTOLIC BLOOD PRESSURE: 65 MMHG | HEART RATE: 74 BPM | SYSTOLIC BLOOD PRESSURE: 99 MMHG | HEIGHT: 63 IN | OXYGEN SATURATION: 99 % | RESPIRATION RATE: 17 BRPM | BODY MASS INDEX: 40.75 KG/M2

## 2020-09-08 DIAGNOSIS — R07.9 ACUTE CHEST PAIN: Primary | ICD-10-CM

## 2020-09-08 LAB
ALBUMIN SERPL-MCNC: 3.9 G/DL (ref 3.5–5)
ALBUMIN/GLOB SERPL: 0.9 {RATIO} (ref 1.1–2.2)
ALP SERPL-CCNC: 88 U/L (ref 45–117)
ALT SERPL-CCNC: 40 U/L (ref 12–78)
ANION GAP SERPL CALC-SCNC: 4 MMOL/L (ref 5–15)
AST SERPL-CCNC: 28 U/L (ref 15–37)
BASOPHILS # BLD: 0 K/UL (ref 0–0.1)
BASOPHILS NFR BLD: 1 % (ref 0–1)
BILIRUB SERPL-MCNC: 0.5 MG/DL (ref 0.2–1)
BUN SERPL-MCNC: 17 MG/DL (ref 6–20)
BUN/CREAT SERPL: 15 (ref 12–20)
CALCIUM SERPL-MCNC: 9.2 MG/DL (ref 8.5–10.1)
CHLORIDE SERPL-SCNC: 103 MMOL/L (ref 97–108)
CK MB CFR SERPL CALC: 0.8 % (ref 0–2.5)
CK MB SERPL-MCNC: 1.5 NG/ML (ref 5–25)
CK SERPL-CCNC: 200 U/L (ref 26–192)
CO2 SERPL-SCNC: 29 MMOL/L (ref 21–32)
CREAT SERPL-MCNC: 1.15 MG/DL (ref 0.55–1.02)
DIFFERENTIAL METHOD BLD: ABNORMAL
EOSINOPHIL # BLD: 0.1 K/UL (ref 0–0.4)
EOSINOPHIL NFR BLD: 3 % (ref 0–7)
ERYTHROCYTE [DISTWIDTH] IN BLOOD BY AUTOMATED COUNT: 13.4 % (ref 11.5–14.5)
GLOBULIN SER CALC-MCNC: 4.5 G/DL (ref 2–4)
GLUCOSE SERPL-MCNC: 81 MG/DL (ref 65–100)
HCT VFR BLD AUTO: 40.3 % (ref 35–47)
HGB BLD-MCNC: 13.2 G/DL (ref 11.5–16)
IMM GRANULOCYTES # BLD AUTO: 0 K/UL (ref 0–0.04)
IMM GRANULOCYTES NFR BLD AUTO: 1 % (ref 0–0.5)
LYMPHOCYTES # BLD: 2 K/UL (ref 0.8–3.5)
LYMPHOCYTES NFR BLD: 44 % (ref 12–49)
MCH RBC QN AUTO: 30.1 PG (ref 26–34)
MCHC RBC AUTO-ENTMCNC: 32.8 G/DL (ref 30–36.5)
MCV RBC AUTO: 91.8 FL (ref 80–99)
MONOCYTES # BLD: 0.3 K/UL (ref 0–1)
MONOCYTES NFR BLD: 7 % (ref 5–13)
NEUTS SEG # BLD: 1.9 K/UL (ref 1.8–8)
NEUTS SEG NFR BLD: 44 % (ref 32–75)
NRBC # BLD: 0 K/UL (ref 0–0.01)
NRBC BLD-RTO: 0 PER 100 WBC
PLATELET # BLD AUTO: 243 K/UL (ref 150–400)
PMV BLD AUTO: 10.3 FL (ref 8.9–12.9)
POTASSIUM SERPL-SCNC: 4.1 MMOL/L (ref 3.5–5.1)
PROT SERPL-MCNC: 8.4 G/DL (ref 6.4–8.2)
RBC # BLD AUTO: 4.39 M/UL (ref 3.8–5.2)
SODIUM SERPL-SCNC: 136 MMOL/L (ref 136–145)
TROPONIN I SERPL-MCNC: <0.05 NG/ML
WBC # BLD AUTO: 4.4 K/UL (ref 3.6–11)

## 2020-09-08 PROCEDURE — 99285 EMERGENCY DEPT VISIT HI MDM: CPT

## 2020-09-08 PROCEDURE — 85025 COMPLETE CBC W/AUTO DIFF WBC: CPT

## 2020-09-08 PROCEDURE — 80053 COMPREHEN METABOLIC PANEL: CPT

## 2020-09-08 PROCEDURE — 94762 N-INVAS EAR/PLS OXIMTRY CONT: CPT

## 2020-09-08 PROCEDURE — 36415 COLL VENOUS BLD VENIPUNCTURE: CPT

## 2020-09-08 PROCEDURE — 71045 X-RAY EXAM CHEST 1 VIEW: CPT

## 2020-09-08 PROCEDURE — 82550 ASSAY OF CK (CPK): CPT

## 2020-09-08 PROCEDURE — 84484 ASSAY OF TROPONIN QUANT: CPT

## 2020-09-08 PROCEDURE — 82553 CREATINE MB FRACTION: CPT

## 2020-09-08 PROCEDURE — 93005 ELECTROCARDIOGRAM TRACING: CPT

## 2020-09-08 RX ORDER — CYCLOBENZAPRINE HCL 10 MG
10 TABLET ORAL
Qty: 20 TAB | Refills: 0 | Status: SHIPPED | OUTPATIENT
Start: 2020-09-08 | End: 2021-07-02

## 2020-09-08 NOTE — ED NOTES
Bedside and Verbal shift change report given to [facility] (oncoming nurse) by Juliana Lujan (offgoing nurse). Report included the following information SBAR, Kardex, ED Summary and MAR.

## 2020-09-08 NOTE — ED NOTES
Bedside shift change report given to [address] and [address] (oncoming nurse) by Karla Calvillo  (offgoing nurse). Report included the following information SBAR, ED Summary, MAR and Recent Results.

## 2020-09-08 NOTE — ED NOTES
Complaint of midsternal chest pain radiating to her posterior neck and back. Pt had minor complaints on Saturday, and then starting yesterday she had more persistent episodes of intermittent pain. Today she had an episode of sweating with the chest pain and she went to [facility] who sent her here.

## 2020-09-08 NOTE — ED NOTES
Pt ambulatory to the bathroom at this time, pt ambulates with a steady gait    2050:  Cheri Del Valle MD reviewed discharge instructions with the patient. The patient verbalized understanding. All questions and concerns were addressed. The patient declined a wheelchair and is discharged ambulatory in the care of family members with instructions and prescriptions in hand. Pt is alert and oriented x 4. Respirations are clear and unlabored.

## 2020-09-09 LAB
ATRIAL RATE: 75 BPM
CALCULATED P AXIS, ECG09: 43 DEGREES
CALCULATED R AXIS, ECG10: 20 DEGREES
CALCULATED T AXIS, ECG11: 38 DEGREES
DIAGNOSIS, 93000: NORMAL
P-R INTERVAL, ECG05: 142 MS
Q-T INTERVAL, ECG07: 408 MS
QRS DURATION, ECG06: 74 MS
QTC CALCULATION (BEZET), ECG08: 455 MS
VENTRICULAR RATE, ECG03: 75 BPM

## 2020-09-09 NOTE — DISCHARGE INSTRUCTIONS

## 2020-09-09 NOTE — ED PROVIDER NOTES
EMERGENCY DEPARTMENT HISTORY AND PHYSICAL EXAM      Date: 9/8/2020  Patient Name: Reuben Kothari    History of Presenting Illness     Chief Complaint   Patient presents with    Chest Pain     Pain at substernal area since yesterday with sweating, denies SOB. Seen at [facility] with possible ekg changes. ASA 4 baby given; EMS reports no changes on therir EKG. History Provided By: Patient, EMS and Med express note     HPI: Reuben Kothari, 60 y.o. female presents to the ED with cc of chest pain. The patient's pain started yesterday. It is a 6 out of 10 and is located in the left midsternal region. She feels some discomfort in the left clavicular region as well. She has no shortness of breath, but states she did have diaphoresis while she was on a zoom call with her boss. She denies any trauma, cough, fever or chills. She also denies nausea or vomiting. She denies leg pain or leg edema. She states that she had a cardiac catheterization about 15 years ago which was normal.  She was sent from [facility] because they felt her EKG was abnormal.  She received 324 mg of aspirin in route. There are no other complaints, changes, or physical findings at this time. PCP: Randolph Melton MD    No current facility-administered medications on file prior to encounter. Current Outpatient Medications on File Prior to Encounter   Medication Sig Dispense Refill    hydroCHLOROthiazide (HYDRODIURIL) 25 mg tablet Take 25 mg by mouth daily.  omeprazole (PRILOSEC) 40 mg capsule Take 40 mg by mouth daily.  pediatric multivitamins chewable tablet Take 1 Tab by mouth two (2) times a day.  loratadine (CLARITIN) 10 mg tablet Take 10 mg by mouth daily as needed.  lisinopril (PRINIVIL, ZESTRIL) 10 mg tablet Take 1 Tab by mouth daily.  30 Tab 0    ergocalciferol (ERGOCALCIFEROL) 50,000 unit capsule TAKE ONE CAPSULE BY MOUTH EVERY 7 DAYS (Patient taking differently: Fridays) 12 Cap 0 Past History     Past Medical History:  Past Medical History:   Diagnosis Date    Anemia     Diabetes ([address]     GERD (gastroesophageal reflux disease)     Hypertension     IGT (impaired glucose tolerance)     Ill-defined condition     LOGAN uses CPAP l0    Menopause     Morbid obesity (HCC)     LOGAN (obstructive sleep apnea)     + CPAP    Reflux     Vitamin D deficiency        Past Surgical History:  Past Surgical History:   Procedure Laterality Date    COLONOSCOPY N/A 10/28/2019    COLONOSCOPY performed by Bolivar Bond [facility] at [facility] ENDOSCOPY    COLONOSCOPY,DIAGNOSTIC  10/28/2019         HX GASTRIC BYPASS  08/31/2016    removal of gastric banding and conversion to gastric bypass    HX GI      anal sphincterotomy    HX HEART CATHETERIZATION      \"years ago\" FOR PALPITATIONS    HX OTHER SURGICAL  5/06    lap-band    OR COLONOSCOPY FLX DX W/COLLJ SPEC WHEN PFRMD  7/14/2011            Family History:  Family History   Problem Relation Age of Onset    Diabetes Mother     Kidney Disease Mother     Cancer Father         esophageal       Social History:  Social History     Tobacco Use    Smoking status: Former Smoker     Years: 1.00     Types: Cigarettes    Smokeless tobacco: Former User     Quit date: 8/15/1984   Substance Use Topics    Alcohol use: Not Currently     Alcohol/week: 0.0 standard drinks    Drug use: No       Allergies: Allergies   Allergen Reactions    Bacitracin Anaphylaxis     Throat swelling, hives, abdominal pain    Neosporin [Neomycin-Bacitracin-Polymyxin] Swelling     Eye drops caused eyes to swell    Adhesive Rash and Itching     Surgical Glue (Derma Yates)    Pineapple Hives    Strawberry Hives         Review of Systems   Review of Systems   Constitutional: Positive for diaphoresis. Negative for fever. HENT: Negative for congestion. Eyes: Negative. Respiratory: Negative for shortness of breath. Cardiovascular: Positive for chest pain.    Gastrointestinal: Negative for abdominal pain. Endocrine: Negative for heat intolerance. Genitourinary: Negative. Musculoskeletal: Negative for back pain. Skin: Negative for rash. Allergic/Immunologic: Negative for immunocompromised state. Neurological: Negative for dizziness. Hematological: Does not bruise/bleed easily. Psychiatric/Behavioral: Negative. All other systems reviewed and are negative. Physical Exam   Physical Exam  Vitals signs and nursing note reviewed. Constitutional:       General: She is not in acute distress. Appearance: She is well-developed. She is not diaphoretic. HENT:      Head: Normocephalic and atraumatic. Eyes:      Pupils: Pupils are equal, round, and reactive to light. Neck:      Musculoskeletal: Normal range of motion and neck supple. Cardiovascular:      Rate and Rhythm: Normal rate and regular rhythm. Heart sounds: Normal heart sounds. No murmur. No friction rub. Pulmonary:      Effort: Pulmonary effort is normal. No respiratory distress. Breath sounds: Normal breath sounds. No wheezing or rales. Chest:      Chest wall: Tenderness present. Comments: reproducible chest wall tenderness  Abdominal:      General: Bowel sounds are normal. There is no distension. Palpations: Abdomen is soft. Tenderness: There is no abdominal tenderness. There is no guarding or rebound. Musculoskeletal: Normal range of motion. General: No tenderness. Skin:     General: Skin is warm and dry. Coloration: Skin is not pale. Neurological:      General: No focal deficit present. Mental Status: She is alert and oriented to person, place, and time. Motor: No abnormal muscle tone.       Coordination: Coordination normal.   Psychiatric:         Mood and Affect: Mood normal.         Behavior: Behavior normal.         Diagnostic Study Results     Labs -     Recent Results (from the past 12 hour(s))   CBC WITH AUTOMATED DIFF    Collection Time: 09/08/20  6:00 PM   Result Value Ref Range    WBC 4.4 3.6 - 11.0 K/uL    RBC 4.39 3.80 - 5.20 M/uL    HGB 13.2 11.5 - 16.0 g/dL    HCT 40.3 35.0 - 47.0 %    MCV 91.8 80.0 - 99.0 FL    MCH 30.1 26.0 - 34.0 PG    MCHC 32.8 30.0 - 36.5 g/dL    RDW 13.4 11.5 - 14.5 %    PLATELET 539 173 - 696 K/uL    MPV 10.3 8.9 - 12.9 FL    NRBC 0.0 0  WBC    ABSOLUTE NRBC 0.00 0.00 - 0.01 K/uL    NEUTROPHILS 44 32 - 75 %    LYMPHOCYTES 44 12 - 49 %    MONOCYTES 7 5 - 13 %    EOSINOPHILS 3 0 - 7 %    BASOPHILS 1 0 - 1 %    IMMATURE GRANULOCYTES 1 (H) 0.0 - 0.5 %    ABS. NEUTROPHILS 1.9 1.8 - 8.0 K/UL    ABS. LYMPHOCYTES 2.0 0.8 - 3.5 K/UL    ABS. MONOCYTES 0.3 0.0 - 1.0 K/UL    ABS. EOSINOPHILS 0.1 0.0 - 0.4 K/UL    ABS. BASOPHILS 0.0 0.0 - 0.1 K/UL    ABS. IMM. GRANS. 0.0 0.00 - 0.04 K/UL    DF AUTOMATED     METABOLIC PANEL, COMPREHENSIVE    Collection Time: 09/08/20  6:00 PM   Result Value Ref Range    Sodium 136 136 - 145 mmol/L    Potassium 4.1 3.5 - 5.1 mmol/L    Chloride 103 97 - 108 mmol/L    CO2 29 21 - 32 mmol/L    Anion gap 4 (L) 5 - 15 mmol/L    Glucose 81 65 - 100 mg/dL    BUN 17 6 - 20 MG/DL    Creatinine 1.15 (H) 0.55 - 1.02 MG/DL    BUN/Creatinine ratio 15 12 - 20      GFR est AA 59 (L) >60 ml/min/1.73m2    GFR est non-AA 49 (L) >60 ml/min/1.73m2    Calcium 9.2 8.5 - 10.1 MG/DL    Bilirubin, total 0.5 0.2 - 1.0 MG/DL    ALT (SGPT) 40 12 - 78 U/L    AST (SGOT) 28 15 - 37 U/L    Alk. phosphatase 88 45 - 117 U/L    Protein, total 8.4 (H) 6.4 - 8.2 g/dL    Albumin 3.9 3.5 - 5.0 g/dL    Globulin 4.5 (H) 2.0 - 4.0 g/dL    A-G Ratio 0.9 (L) 1.1 - 2.2     CK W/ REFLX CKMB    Collection Time: 09/08/20  6:00 PM   Result Value Ref Range     (H) 26 - 192 U/L   TROPONIN I    Collection Time: 09/08/20  6:00 PM   Result Value Ref Range    Troponin-I, Qt. <0.05 <0.05 ng/mL   CK-MB,QUANT.     Collection Time: 09/08/20  6:00 PM   Result Value Ref Range    CK - MB 1.5 <3.6 NG/ML    CK-MB Index 0.8 0.0 - 2.5     EKG, 12 LEAD, INITIAL    Collection Time: 09/08/20  6:02 PM   Result Value Ref Range    Ventricular Rate 75 BPM    Atrial Rate 75 BPM    P-R Interval 142 ms    QRS Duration 74 ms    Q-T Interval 408 ms    QTC Calculation (Bezet) 455 ms    Calculated P Axis 43 degrees    Calculated R Axis 20 degrees    Calculated T Axis 38 degrees    Diagnosis       Normal sinus rhythm  Normal ECG  When compared with ECG of 15-AUG-2016 09:55,  No significant change was found         Radiologic Studies -   XR CHEST PORT   Final Result   IMPRESSION: No acute findings. CT Results  (Last 48 hours)    None        CXR Results  (Last 48 hours)               09/08/20 1918  XR CHEST PORT Final result    Impression:  IMPRESSION: No acute findings. Narrative:  EXAM: XR CHEST PORT       INDICATION: CP       COMPARISON: 8/15/2016 chest x-ray       FINDINGS: A portable AP radiograph of the chest was obtained at 1913 hours. There is no pneumothorax or pleural effusion. The lungs are clear. Cardiac size   is normal. There is no mediastinal or hilar enlargement. The bones and soft   tissues are grossly within normal limits. Medical Decision Making   I am the first provider for this patient. I reviewed the vital signs, available nursing notes, past medical history, past surgical history, family history and social history. Vital Signs-Reviewed the patient's vital signs. Patient Vitals for the past 12 hrs:   Temp Pulse Resp BP SpO2   09/08/20 1915 98.5 °F (36.9 °C) 74 17 99/65 99 %   09/08/20 1847     100 %   09/08/20 1753 98.8 °F (37.1 °C) 78 16 (!) 148/103 97 %       EKG interpretation: (Preliminary)  Rhythm: normal sinus rhythm; and regular . Rate (approx.): 75; Axis: normal; CA interval: normal; QRS interval: normal ; ST/T wave: normal; Other findings: unchanged from previous ekg.     Records Reviewed: Nursing Notes, Old Medical Records, Previous electrocardiograms, Ambulance Run Sheet, Previous Radiology Studies and Previous Laboratory Studies    Provider Notes (Medical Decision Making):   CAD, costochondritis, reflux, gastritis    ED Course:   Initial assessment performed. The patients presenting problems have been discussed, and they are in agreement with the care plan formulated and outlined with them. I have encouraged them to ask questions as they arise throughout their visit. Progress note: The patient's results were reviewed. The patient is advised to follow-up and return to ER if worse             Critical Care Time:     0    Disposition:  home    DISCHARGE PLAN:  1. Current Discharge Medication List      START taking these medications    Details   cyclobenzaprine (FLEXERIL) 10 mg tablet Take 1 Tab by mouth three (3) times daily as needed for Muscle Spasm(s). Qty: 20 Tab, Refills: 0           2. Follow-up Information     Follow up With Specialties Details Why Contact Info    Linnea Brunner, MD Cardiology Call in 1 day Call your old cardiologist [address]  [address] [facility]      Chacho Loza MD Family Medicine  As needed [address] [facility] [address] [place] [address] [phone]      [address] DEPT Emergency Medicine  If symptoms worsen [address]  [address] [facility]  [phone]        3. Return to ED if worse     Diagnosis     Clinical Impression:   1. Acute chest pain        Attestations:    Renetta Cash MD    Please note that this dictation was completed with [employer], the computer voice recognition software. Quite often unanticipated grammatical, syntax, homophones, and other interpretive errors are inadvertently transcribed by the computer software. Please disregard these errors. Please excuse any errors that have escaped final proofreading. Thank you.

## 2021-07-02 ENCOUNTER — VIRTUAL VISIT (OUTPATIENT)
Dept: SLEEP MEDICINE | Age: 56
End: 2021-07-02
Payer: COMMERCIAL

## 2021-07-02 ENCOUNTER — TELEPHONE (OUTPATIENT)
Dept: SLEEP MEDICINE | Age: 56
End: 2021-07-02

## 2021-07-02 ENCOUNTER — DOCUMENTATION ONLY (OUTPATIENT)
Dept: SLEEP MEDICINE | Age: 56
End: 2021-07-02

## 2021-07-02 DIAGNOSIS — G47.33 OSA (OBSTRUCTIVE SLEEP APNEA): Primary | ICD-10-CM

## 2021-07-02 DIAGNOSIS — I10 ESSENTIAL HYPERTENSION: ICD-10-CM

## 2021-07-02 PROCEDURE — 99213 OFFICE O/P EST LOW 20 MIN: CPT | Performed by: NURSE PRACTITIONER

## 2021-07-02 NOTE — TELEPHONE ENCOUNTER
Called patient to schedule Titration, per patient she is getting a sew in (weave) and will call back to schedule.

## 2021-07-02 NOTE — PATIENT INSTRUCTIONS
[address] [facility] [place]. [place], [address]  Tel.  [phone]  Fax. 100 [facility] 60  [place], [address] [facility]  Tel.  [phone]  Fax. [phone] [address]  Tel.  [phone]  Fax. [phone]     Learning About CPAP for Sleep Apnea  What is CPAP? CPAP is a small machine that you use at home every night while you sleep. It increases air pressure in your throat to keep your airway open. When you have sleep apnea, this can help you sleep better so you feel much better. CPAP stands for \"continuous positive airway pressure. \"  The CPAP machine will have one of the following:  · A mask that covers your nose and mouth  · Prongs that fit into your nose  · A mask that covers your nose only, the most common type. This type is called NCPAP. The N stands for \"nasal.\"  Why is it done? CPAP is usually the best treatment for obstructive sleep apnea. It is the first treatment choice and the most widely used. Your doctor may suggest CPAP if you have:  · Moderate to severe sleep apnea. · Sleep apnea and coronary artery disease (CAD) or heart failure. How does it help? · CPAP can help you have more normal sleep, so you feel less sleepy and more alert during the daytime. · CPAP may help keep heart failure or other heart problems from getting worse. · NCPAP may help lower your blood pressure. · If you use CPAP, your bed partner may also sleep better because you are not snoring or restless. What are the side effects? Some people who use CPAP have:  · A dry or stuffy nose and a sore throat. · Irritated skin on the face. · Sore eyes. · Bloating. If you have any of these problems, work with your doctor to fix them. Here are some things you can try:  · Be sure the mask or nasal prongs fit well. · See if your doctor can adjust the pressure of your CPAP. · If your nose is dry, try a humidifier.   · If your nose is runny or stuffy, try decongestant medicine or a steroid nasal spray. If these things do not help, you might try a different type of machine. Some machines have air pressure that adjusts on its own. Others have air pressures that are different when you breathe in than when you breathe out. This may reduce discomfort caused by too much pressure in your nose. Where can you learn more? Go to Get-n-Post.be  Enter Giovanny Canapril in the search box to learn more about \"Learning About CPAP for Sleep Apnea. \"   © 6009-2903 Healthwise, Incorporated. Care instructions adapted under license by [address] [place] [address] [facility] (which disclaims liability or warranty for this information). This care instruction is for use with your licensed healthcare professional. If you have questions about a medical condition or this instruction, always ask your healthcare professional. [address] any warranty or liability for your use of this information. Content Version: 4.1.36516; Last Revised: January 11, 2010  PROPER SLEEP HYGIENE    What to avoid  · Do not have drinks with caffeine, such as coffee or black tea, for 8 hours before bed. · Do not smoke or use other types of tobacco near bedtime. Nicotine is a stimulant and can keep you awake. · Avoid drinking alcohol late in the evening, because it can cause you to wake in the middle of the night. · Do not eat a big meal close to bedtime. If you are hungry, eat a light snack. · Do not drink a lot of water close to bedtime, because the need to urinate may wake you up during the night. · Do not read or watch TV in bed. Use the bed only for sleeping and sexual activity. What to try  · Go to bed at the same time every night, and wake up at the same time every morning. Do not take naps during the day. · Keep your bedroom quiet, dark, and cool. · Get regular exercise, but not within 3 to 4 hours of your bedtime. .  · Sleep on a comfortable pillow and mattress.   · If watching the clock makes you anxious, turn it facing away from you so you cannot see the time. · If you worry when you lie down, start a worry book. Well before bedtime, write down your worries, and then set the book and your concerns aside. · Try meditation or other relaxation techniques before you go to bed. · If you cannot fall asleep, get up and go to another room until you feel sleepy. Do something relaxing. Repeat your bedtime routine before you go to bed again. · Make your house quiet and calm about an hour before bedtime. Turn down the lights, turn off the TV, log off the computer, and turn down the volume on music. This can help you relax after a busy day. Drowsy Driving: The Count includes the [facility] cites drowsiness as a causing factor in more than 780,065 police reported crashes annually, resulting in 76,000 injuries and 1,500 deaths. Other surveys suggest 55% of people polled have driven while drowsy in the past year, 23% had fallen asleep but not crashed, 3% crashed, and 2% had and accident due to drowsy driving. Who is at risk? Young Drivers: One study of drowsy driving accidents states that 55% of the drivers were under 25 years. Of those, 75% were male. Shift Workers and Travelers: People who work overnight or travel across time zones frequently are at higher risk of experiencing Circadian Rhythm Disorders. They are trying to work and function when their body is programed to sleep. Sleep Deprived: Lack of sleep has a serious impact on your ability to pay attention or focus on a task. Consistently getting less than the average of 8 hours your body needs creates partial or cumulative sleep deprivation. Untreated Sleep Disorders: Sleep Apnea, Narcolepsy, R.L.S., and other sleep disorders (untreated) prevent a person from getting enough restful sleep. This leads to excessive daytime sleepiness and increases the risk for drowsy driving accidents by up to 7 times.   Medications / Alcohol: Even over the counter medications can cause drowsiness. Medications that impair a drivers attention should have a warning label. Alcohol naturally makes you sleepy and on its own can cause accidents. Combined with excessive drowsiness its effects are amplified. Signs of Drowsy Driving:   * You don't remember driving the last few miles   * You may drift out of your daphne   * You are unable to focus and your thoughts wander   * You may yawn more often than normal   * You have difficulty keeping your eyes open / nodding off   * Missing traffic signs, speeding, or tailgating  Prevention-   Good sleep hygiene, lifestyle and behavioral choices have the most impact on drowsy driving. There is no substitute for sleep and the average person requires 8 hours nightly. If you find yourself driving drowsy, stop and sleep. Consider the sleep hygiene tips provided during your visit as well. Medication Refill Policy: Refills for all medications require 1 week advance notice. Please have your pharmacy fax a refill request. We are unable to fax, or call in \"controled substance\" medications and you will need to pick these prescriptions up from our office. [employer] Activation    Thank you for requesting access to [employer]. Please follow the instructions below to securely access and download your online medical record. [employer] allows you to send messages to your doctor, view your test results, renew your prescriptions, schedule appointments, and more. How Do I Sign Up? 1. In your internet browser, go to [email or web address]. 2. Click on the First Time User? Click Here link in the Sign In box. You will see the New Member Sign Up page. 3. Enter your Aductions Access Code exactly as it appears below. You will not need to use this code after youve completed the sign-up process. If you do not sign up before the expiration date, you must request a new code.     A[employer]ctions Access Code: [email or web address]  Expires: 2021  8:29 AM (This is the date your pyco access code will )    4. Enter the last four digits of your Social Security Number (xxxx) and Date of Birth (mm/dd/yyyy) as indicated and click Submit. You will be taken to the next sign-up page. 5. Create a pyco ID. This will be your pyco login ID and cannot be changed, so think of one that is secure and easy to remember. 6. Create a pyco password. You can change your password at any time. 7. Enter your Password Reset Question and Answer. This can be used at a later time if you forget your password. 8. Enter your e-mail address. You will receive e-mail notification when new information is available in [address] 9. Click Sign Up. You can now view and download portions of your medical record. 10. Click the Download Summary menu link to download a portable copy of your medical information. Additional Information    If you have questions, please call [phone]. Remember, pyco is NOT to be used for urgent needs. For medical emergencies, dial 911.

## 2021-07-02 NOTE — PROGRESS NOTES
[address] [facility] [place]. [place], [address]   Tel.  [phone]   Fax. 100 [facility] 60   [place], [address] [facility]   Tel.  [phone]   Fax. [phone] [address] [place]    Tel.  [phone]   Fax. [phone] Derek Rivera (: 1965) is a 60 y.o. female, established patient, seen for positive airway pressure follow-up, she was last seen by Dr. Kika Holland on 2020, prior notes reviewed in detail. Home sleep test 2015 showed AHI of 6.3/hr with a lowest SpO2 of 84%. ASSESSMENT/PLAN:    ICD-10-CM ICD-9-CM    1. LOGAN (obstructive sleep apnea)  G47.33 327.23 AMB SUPPLY ORDER      SLEEP LAB (PAP TITRATION)   2. Essential hypertension  I10 401.9    3. Adult BMI 39.0-39.9 kg/sq m  Z68.39 V85.39        AHI = 6.3(2015). On ResMed APAP :  6-12 cmH2O. Set up 2015. She is adherent with PAP therapy and PAP continues to benefit patient and remains necessary for control of her sleep apnea. She is not interested in a new device at this time. Follow-up and Dispositions    · Return in about 3 months (around 10/2/2021), or if symptoms worsen or fail to improve. 1. Sleep Apnea - Continue on current pressures. In lab titration for frequent awakenings and non-restorative sleep. She is fully vaccinated    *  Supplies ordered - nasal mask and heated tubing    Orders Placed This Encounter    AMB SUPPLY ORDER     Diagnosis: (G47.33) LOGAN (obstructive sleep apnea)  (primary encounter diagnosis)     Replacement Supplies for Positive Airway Pressure Therapy Device:   Duration of need: 99 months.  Nasal Cushion (Replace) 2 per month.  Nasal Interface Mask 1 every 3 months.  Pos Airway pressure chin strap   Headgear 1 every 6 months.  Tubing with heating element 1 every 3 months.  Filter(s) Disposable 2 per month.  Filter(s) Non-Disposable 1 every 6 months.    .   [address] [facility] for Humidifier (Replace) 1 every 6 months. RAVINDRA Rankin-BC; NPI: [id number]    Electronically signed. Date:- 07/02/21    SLEEP LAB (PAP TITRATION)     Currently on APAP 6-12 , frequent awakenings and non-restorative sleep    She is fully vaccinated     Standing Status:   Future     Standing Expiration Date:   1/2/2022     Scheduling Instructions:      Please route to Dr. Elyse Cruz for interp. Order Specific Question:   Reason for Exam     Answer:   LOGAN,       * Counseling was provided regarding the importance of regular PAP use with emphasis on ensuring sufficient total sleep time, proper sleep hygiene, and benefits of outdoor activity. .    * Re-enforced proper and regular cleaning for the device. We discussed the risk associated with use of cleaning devices and use of dish soap and water as an appropriate cleaning method. * She was asked to contact our office for any problems regarding PAP therapy. 2. Hypertension -  continue on her current regimen. 3. Encouraged continued weight management program through appropriate diet and exercise regimen as significant weight reduction has been shown to reduce severity of obstructive sleep apnea. She is trying to walk for 30 min daily. SUBJECTIVE/OBJECTIVE:    She  is seen today for follow up on PAP device and reports no problems using the device. The following concerns reviewed:    Drowsiness no Problems exhaling no   Snoring no Forget to put on no   Mask Comfortable yes Can't fall asleep no   Dry Mouth no Mask falls off no   Air Leaking no Frequent awakenings no       She admits that her sleep has improved on PAP therapy using nasal mask and heated tubing.  She notes that she feels she is not sleeping as well as she did when she first got the device and Is waking up not feeling refreshed with frequent awakenings during the night    Review of device download indicated:  auto pressure: 6-12 cmH2O; Max Pressure: 11.3 cmH2O;  95th percentile Pressure: 10.5 cmH2O   95th Percentile Leak: 5.5 L/Min     % Used Days >= 4 hours: 93.  Avg hours used:  7:22. Therapy Apnea Index averaged over PAP use: 1.5 /hr which reflects improved sleep breathing condition. [place] Sleepiness Score: 8 and Modified F.O.S.Q. Score Total / 2: 15 which reflects improved sleep quality over therapy time. Sleep Review of Systems: notable for Negative difficulty falling asleep; Positive awakenings at night; Negative early morning headaches; Negative memory problems; Negative concentration issues; Negative chest pain; Negative shortness of breath; Negative significant joint pain at night; Negative significant muscle pain at night; Negative rashes or itching; Negative heartburn; Negative significant mood issues; 0 afternoon naps per week    Vitals reported by patient   Patient-Reported Vitals 7/2/2021   Patient-Reported Weight 223   Patient-Reported Height -   Patient-Reported Systolic  809   Patient-Reported Diastolic 80      Calculated BMI 39    Physical Exam completed by visual and auditory observation of patient with verbal input from patient. General:   Alert, oriented, not in acute distress   Eyes:  Anicteric Sclerae; no obvious strabismus   Nose:  No obvious nasal septum deviation    Neck:   Midline trachea, no visible mass   Chest/Lungs:  Respiratory effort normal, no visualized signs of difficulty breathing or respiratory distress   CVS:  No JVD   Extremities:  No obvious rashes noted on face, neck, or hands   Neuro:  No facial asymmetry, no focal deficits; no obvious tremor    Psych:  Normal affect,  normal countenance     Reginold Cramp is being evaluated by a Virtual Visit (video visit) encounter to address concerns as mentioned above. A caregiver was present when appropriate.  Due to this being a TeleHealth encounter (During [place] [address] public health emergency), evaluation of the following organ systems was limited: Vitals/Constitutional/EENT/Resp/CV/GI//MS/Neuro/Skin/Heme-Lymph-Imm. Pursuant to the emergency declaration under the [address] [place] [address], [address] [place] [address] and the [employer] and [employer] Act, this Virtual Visit was conducted with patient's (and/or legal guardian's) consent, to reduce the patient's risk of exposure to COVID-19 and provide necessary medical care. Patient identification was verified at the start of the visit: YES using name and date of birth. Patient's phone number [phone] (cell) was confirmed for accuracy. She gives permission for messages regarding results and appointments to be left at that number. Services were provided through a video synchronous discussion virtually to substitute for in-person clinic visit. I was in the office while conducting this encounter, patient located at their home or alternate location of their choice. An electronic signature was used to authenticate this note.     -- Fabiana Jenkins NP, [facility]  07/02/21

## 2021-07-07 ENCOUNTER — TRANSCRIBE ORDER (OUTPATIENT)
Dept: SCHEDULING | Age: 56
End: 2021-07-07

## 2021-07-07 DIAGNOSIS — Z12.31 VISIT FOR SCREENING MAMMOGRAM: Primary | ICD-10-CM

## 2021-07-26 ENCOUNTER — HOSPITAL ENCOUNTER (OUTPATIENT)
Dept: MAMMOGRAPHY | Age: 56
Discharge: HOME | End: 2021-07-26
Attending: FAMILY MEDICINE
Payer: COMMERCIAL

## 2021-07-26 DIAGNOSIS — Z12.31 VISIT FOR SCREENING MAMMOGRAM: ICD-10-CM

## 2021-07-26 PROCEDURE — 77067 SCR MAMMO BI INCL CAD: CPT

## 2021-09-01 ENCOUNTER — HOSPITAL ENCOUNTER (OUTPATIENT)
Dept: SLEEP MEDICINE | Age: 56
Discharge: HOME | End: 2021-09-01
Payer: COMMERCIAL

## 2021-09-01 VITALS
OXYGEN SATURATION: 97 % | TEMPERATURE: 97.8 F | SYSTOLIC BLOOD PRESSURE: 124 MMHG | HEIGHT: 63 IN | WEIGHT: 230 LBS | BODY MASS INDEX: 40.75 KG/M2 | HEART RATE: 75 BPM | DIASTOLIC BLOOD PRESSURE: 88 MMHG

## 2021-09-01 DIAGNOSIS — G47.33 OSA (OBSTRUCTIVE SLEEP APNEA): ICD-10-CM

## 2021-09-01 PROCEDURE — 95811 POLYSOM 6/>YRS CPAP 4/> PARM: CPT | Performed by: INTERNAL MEDICINE

## 2021-09-02 ENCOUNTER — DOCUMENTATION ONLY (OUTPATIENT)
Dept: SLEEP MEDICINE | Age: 56
End: 2021-09-02

## 2021-09-02 NOTE — PROGRESS NOTES
217 [facility] [place]. [place], [address]  Tel.  [phone]  Fax. 100 [facility] 60  [place], [address] [facility]  Tel.  [phone]  Fax. [phone] [address] [facility] [place]   Tel.  [phone]  Fax. [phone]     Sleep Study Technical Notes        PRE-Test:  Nila Hoffmann (: 1965) arrived in the lobby. Patient was greeted, temperature checked (97.8) and screening questions asked. The patient was  taken directly to her room. BP (124/88) and SaO2 (97) were taken. Weight per patient (230). Procedure explained to the patient and questions were answered. The patient expressed understanding of the procedure. Electrodes were applied without incident. The patient was placed in bed and the study was started. Acquisition Notes:   Lights off: 10:19pm    Respiratory events: hypops   ECG:  NSR   Snoring:  mild    PAP titration: cpap 10   Mask(s) Used: Resmed Airtouch F20 FFM size small   Other comments:   o Patient to bathroom 1 time    POST Test:   Patient was awakened. Electrodes were removed. The patient was discharged after answering the Post Questionnaire. Patient stated that she was alert and ok to drive.  Equipment and room cleaned per infection control policy.

## 2021-09-10 ENCOUNTER — TELEPHONE (OUTPATIENT)
Dept: SLEEP MEDICINE | Age: 56
End: 2021-09-10

## 2021-09-10 ENCOUNTER — DOCUMENTATION ONLY (OUTPATIENT)
Dept: SLEEP MEDICINE | Age: 56
End: 2021-09-10

## 2021-09-10 NOTE — TELEPHONE ENCOUNTER
Titration study interpreted. * Device pressure change to Resmed  10 cmH2O by lead technologist either remotely or at PAP clinic (notify patient).     * PAP card download in 4 weeks.     * Office visit in 3 months or as needed with NP.

## 2021-09-15 ENCOUNTER — PATIENT MESSAGE (OUTPATIENT)
Dept: SLEEP MEDICINE | Age: 56
End: 2021-09-15

## 2021-09-15 NOTE — TELEPHONE ENCOUNTER
Pressure setting changed on device per Dr. Poppy Sarmiento. New pressure setting -   CPAP 10cm H2O sent remotely. Download scheduled for 4 weeks. My chart message sent to patient explaining the change.

## 2021-10-13 ENCOUNTER — TELEPHONE (OUTPATIENT)
Dept: SLEEP MEDICINE | Age: 56
End: 2021-10-13

## 2022-07-14 ENCOUNTER — TRANSCRIBE ORDER (OUTPATIENT)
Dept: SCHEDULING | Age: 57
End: 2022-07-14

## 2022-07-14 DIAGNOSIS — Z12.31 SCREENING MAMMOGRAM FOR HIGH-RISK PATIENT: Primary | ICD-10-CM

## 2022-08-11 ENCOUNTER — HOSPITAL ENCOUNTER (OUTPATIENT)
Dept: MAMMOGRAPHY | Age: 57
Discharge: HOME | End: 2022-08-11
Attending: FAMILY MEDICINE
Payer: COMMERCIAL

## 2022-08-11 DIAGNOSIS — Z12.31 SCREENING MAMMOGRAM FOR HIGH-RISK PATIENT: ICD-10-CM

## 2022-08-11 PROCEDURE — 77067 SCR MAMMO BI INCL CAD: CPT

## 2023-08-21 ENCOUNTER — TRANSCRIBE ORDERS (OUTPATIENT)
Facility: HOSPITAL | Age: 58
End: 2023-08-21

## 2023-08-21 DIAGNOSIS — Z12.31 ENCOUNTER FOR SCREENING MAMMOGRAM FOR BREAST CANCER: Primary | ICD-10-CM

## 2023-09-06 ENCOUNTER — HOSPITAL ENCOUNTER (OUTPATIENT)
Facility: HOSPITAL | Age: 58
Discharge: HOME | End: 2023-09-09
Payer: COMMERCIAL

## 2023-09-06 DIAGNOSIS — Z12.31 ENCOUNTER FOR SCREENING MAMMOGRAM FOR BREAST CANCER: ICD-10-CM

## 2023-09-06 PROCEDURE — 77067 SCR MAMMO BI INCL CAD: CPT

## 2024-08-19 ENCOUNTER — TRANSCRIBE ORDERS (OUTPATIENT)
Facility: HOSPITAL | Age: 59
End: 2024-08-19

## 2024-08-19 DIAGNOSIS — Z12.31 SCREENING MAMMOGRAM FOR HIGH-RISK PATIENT: Primary | ICD-10-CM

## 2024-09-18 ENCOUNTER — HOSPITAL ENCOUNTER (OUTPATIENT)
Facility: HOSPITAL | Age: 59
Discharge: HOME | End: 2024-09-21
Payer: COMMERCIAL

## 2024-09-18 VITALS — HEIGHT: 63 IN | WEIGHT: 230 LBS | BODY MASS INDEX: 40.75 KG/M2

## 2024-09-18 DIAGNOSIS — Z12.31 SCREENING MAMMOGRAM FOR HIGH-RISK PATIENT: ICD-10-CM

## 2024-09-18 PROCEDURE — 77063 BREAST TOMOSYNTHESIS BI: CPT

## (undated) DEVICE — NEEDLE HYPO 18GA L1.5IN PNK S STL HUB POLYPR SHLD REG BVL

## (undated) DEVICE — Device

## (undated) DEVICE — 1200 GUARD II KIT W/5MM TUBE W/O VAC TUBE: Brand: GUARDIAN

## (undated) DEVICE — BASIN EMSIS 16OZ GRAPHITE PLAS KID SHP MOLD GRAD FOR ORAL

## (undated) DEVICE — SYR 3ML LL TIP 1/10ML GRAD --

## (undated) DEVICE — NEONATAL-ADULT SPO2 SENSOR: Brand: NELLCOR

## (undated) DEVICE — ELECTRODE,RADIOTRANSLUCENT,FOAM,5PK: Brand: MEDLINE

## (undated) DEVICE — SOLIDIFIER MEDC 1200ML -- CONVERT TO 356117

## (undated) DEVICE — TOWEL 4 PLY TISS 19X30 SUE WHT

## (undated) DEVICE — SYR 10ML LUER LOK 1/5ML GRAD --

## (undated) DEVICE — SET ADMIN 16ML TBNG L100IN 2 Y INJ SITE IV PIGGY BK DISP

## (undated) DEVICE — Z DISCONTINUED PER MEDLINE LINE GAS SAMPLING O2/CO2 LNG AD 13 FT NSL W/ TBNG FILTERLINE

## (undated) DEVICE — CATH IV AUTOGRD BC PNK 20GA 25 -- INSYTE